# Patient Record
Sex: MALE | Race: WHITE | Employment: PART TIME | URBAN - METROPOLITAN AREA
[De-identification: names, ages, dates, MRNs, and addresses within clinical notes are randomized per-mention and may not be internally consistent; named-entity substitution may affect disease eponyms.]

---

## 2017-03-16 ENCOUNTER — APPOINTMENT (OUTPATIENT)
Dept: CT IMAGING | Age: 22
End: 2017-03-16
Attending: EMERGENCY MEDICINE
Payer: OTHER GOVERNMENT

## 2017-03-16 ENCOUNTER — HOSPITAL ENCOUNTER (EMERGENCY)
Age: 22
Discharge: HOME | End: 2017-03-16
Attending: EMERGENCY MEDICINE
Payer: OTHER GOVERNMENT

## 2017-03-16 VITALS
DIASTOLIC BLOOD PRESSURE: 70 MMHG | HEIGHT: 73 IN | WEIGHT: 182 LBS | HEART RATE: 71 BPM | SYSTOLIC BLOOD PRESSURE: 127 MMHG | RESPIRATION RATE: 18 BRPM | BODY MASS INDEX: 24.12 KG/M2 | OXYGEN SATURATION: 98 % | TEMPERATURE: 98.1 F
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
## 2017-03-16 DIAGNOSIS — R51.9 FRONTAL HEADACHE: ICD-10-CM

## 2017-03-16 PROCEDURE — 99284 EMERGENCY DEPT VISIT MOD MDM: CPT

## 2017-03-16 PROCEDURE — 74011250637 HC RX REV CODE- 250/637: Performed by: PHYSICIAN ASSISTANT

## 2017-03-16 PROCEDURE — 70450 CT HEAD/BRAIN W/O DYE: CPT

## 2017-03-16 RX ORDER — IBUPROFEN 600 MG/1
600 TABLET ORAL
Status: COMPLETED | OUTPATIENT
Start: 2017-03-16 | End: 2017-03-16

## 2017-03-16 RX ORDER — CYCLOBENZAPRINE HCL 10 MG
10 TABLET ORAL
Qty: 15 TAB | Refills: 0 | Status: SHIPPED | OUTPATIENT
Start: 2017-03-16

## 2017-03-16 RX ORDER — CYCLOBENZAPRINE HCL 10 MG
10 TABLET ORAL
Status: COMPLETED | OUTPATIENT
Start: 2017-03-16 | End: 2017-03-16

## 2017-03-16 RX ORDER — IBUPROFEN 600 MG/1
600 TABLET ORAL
Qty: 21 TAB | Refills: 0 | Status: SHIPPED | OUTPATIENT
Start: 2017-03-16 | End: 2017-03-23

## 2017-03-16 RX ADMIN — CYCLOBENZAPRINE HYDROCHLORIDE 10 MG: 10 TABLET, FILM COATED ORAL at 22:50

## 2017-03-16 RX ADMIN — IBUPROFEN 600 MG: 600 TABLET, FILM COATED ORAL at 22:50

## 2017-03-16 NOTE — LETTER
NOTIFICATION RETURN TO WORK / SCHOOL 
 
3/16/2017 11:01 PM 
 
Mr. Efrain Buchanan [facility] [id number] To Whom It May Concern: 
 
Mike Elizabeth is currently under the care of [facility]. He will return to work/school on: 03/18/17 If there are questions or concerns please have the patient contact our office.  
 
 
 
Sincerely, 
 
 
 
 
 
 
LG Carlin
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
## 2020-02-19 ENCOUNTER — HOSPITAL ENCOUNTER (EMERGENCY)
Age: 25
Discharge: HOME | End: 2020-02-19
Attending: EMERGENCY MEDICINE
Payer: SELF-PAY

## 2020-02-19 ENCOUNTER — HOSPITAL ENCOUNTER (OUTPATIENT)
Dept: CT IMAGING | Age: 25
Discharge: HOME | End: 2020-02-19
Attending: NURSE PRACTITIONER
Payer: SELF-PAY

## 2020-02-19 VITALS
SYSTOLIC BLOOD PRESSURE: 120 MMHG | TEMPERATURE: 97.8 F | HEART RATE: 85 BPM | DIASTOLIC BLOOD PRESSURE: 75 MMHG | RESPIRATION RATE: 16 BRPM | OXYGEN SATURATION: 98 %

## 2020-02-19 DIAGNOSIS — R11.2 NON-INTRACTABLE VOMITING WITH NAUSEA, UNSPECIFIED VOMITING TYPE: ICD-10-CM

## 2020-02-19 DIAGNOSIS — R10.84 ABDOMINAL PAIN, GENERALIZED: Primary | ICD-10-CM

## 2020-02-19 DIAGNOSIS — R19.7 DIARRHEA, UNSPECIFIED TYPE: ICD-10-CM

## 2020-02-19 LAB
ALBUMIN SERPL-MCNC: 4.5 G/DL (ref 3.4–5)
ALBUMIN/GLOB SERPL: 1.3 {RATIO} (ref 0.8–1.7)
ALP SERPL-CCNC: 72 U/L (ref 45–117)
ALT SERPL-CCNC: 20 U/L (ref 16–61)
ANION GAP SERPL CALC-SCNC: 7 MMOL/L (ref 3–18)
APPEARANCE UR: CLEAR
AST SERPL-CCNC: 13 U/L (ref 10–38)
BACTERIA URNS QL MICRO: ABNORMAL /HPF
BASOPHILS # BLD: 0 K/UL (ref 0–0.1)
BASOPHILS NFR BLD: 0 % (ref 0–2)
BILIRUB SERPL-MCNC: 1.7 MG/DL (ref 0.2–1)
BILIRUB UR QL: NEGATIVE
BUN SERPL-MCNC: 21 MG/DL (ref 7–18)
BUN/CREAT SERPL: 22 (ref 12–20)
CALCIUM SERPL-MCNC: 9.5 MG/DL (ref 8.5–10.1)
CHLORIDE SERPL-SCNC: 107 MMOL/L (ref 100–111)
CO2 SERPL-SCNC: 26 MMOL/L (ref 21–32)
COLOR UR: ABNORMAL
CREAT SERPL-MCNC: 0.94 MG/DL (ref 0.6–1.3)
DIFFERENTIAL METHOD BLD: ABNORMAL
EOSINOPHIL # BLD: 0 K/UL (ref 0–0.4)
EOSINOPHIL NFR BLD: 0 % (ref 0–5)
EPITH CASTS URNS QL MICRO: ABNORMAL /LPF (ref 0–5)
ERYTHROCYTE [DISTWIDTH] IN BLOOD BY AUTOMATED COUNT: 12.6 % (ref 11.6–14.5)
GLOBULIN SER CALC-MCNC: 3.6 G/DL (ref 2–4)
GLUCOSE SERPL-MCNC: 113 MG/DL (ref 74–99)
GLUCOSE UR STRIP.AUTO-MCNC: NEGATIVE MG/DL
HCT VFR BLD AUTO: 44.5 % (ref 36–48)
HGB BLD-MCNC: 16.1 G/DL (ref 13–16)
HGB UR QL STRIP: NEGATIVE
KETONES UR QL STRIP.AUTO: >160 MG/DL
LEUKOCYTE ESTERASE UR QL STRIP.AUTO: ABNORMAL
LIPASE SERPL-CCNC: 28 U/L (ref 73–393)
LYMPHOCYTES # BLD: 0.4 K/UL (ref 0.9–3.6)
LYMPHOCYTES NFR BLD: 4 % (ref 21–52)
MCH RBC QN AUTO: 31.9 PG (ref 24–34)
MCHC RBC AUTO-ENTMCNC: 36.2 G/DL (ref 31–37)
MCV RBC AUTO: 88.3 FL (ref 74–97)
MONOCYTES # BLD: 0.3 K/UL (ref 0.05–1.2)
MONOCYTES NFR BLD: 3 % (ref 3–10)
MUCOUS THREADS URNS QL MICRO: ABNORMAL /LPF
NEUTS SEG # BLD: 10 K/UL (ref 1.8–8)
NEUTS SEG NFR BLD: 93 % (ref 40–73)
NITRITE UR QL STRIP.AUTO: NEGATIVE
PH UR STRIP: 6 [PH] (ref 5–8)
PLATELET # BLD AUTO: 147 K/UL (ref 135–420)
PMV BLD AUTO: 10.9 FL (ref 9.2–11.8)
POTASSIUM SERPL-SCNC: 3.9 MMOL/L (ref 3.5–5.5)
PROT SERPL-MCNC: 8.1 G/DL (ref 6.4–8.2)
PROT UR STRIP-MCNC: ABNORMAL MG/DL
RBC # BLD AUTO: 5.04 M/UL (ref 4.7–5.5)
RBC #/AREA URNS HPF: ABNORMAL /HPF (ref 0–5)
SODIUM SERPL-SCNC: 140 MMOL/L (ref 136–145)
SP GR UR REFRACTOMETRY: >1.03 (ref 1–1.03)
UROBILINOGEN UR QL STRIP.AUTO: 1 EU/DL (ref 0.2–1)
WBC # BLD AUTO: 10.8 K/UL (ref 4.6–13.2)
WBC URNS QL MICRO: ABNORMAL /HPF (ref 0–4)

## 2020-02-19 PROCEDURE — 85025 COMPLETE CBC W/AUTO DIFF WBC: CPT

## 2020-02-19 PROCEDURE — 83690 ASSAY OF LIPASE: CPT

## 2020-02-19 PROCEDURE — 99284 EMERGENCY DEPT VISIT MOD MDM: CPT

## 2020-02-19 PROCEDURE — 74011636320 HC RX REV CODE- 636/320: Performed by: NURSE PRACTITIONER

## 2020-02-19 PROCEDURE — 80053 COMPREHEN METABOLIC PANEL: CPT

## 2020-02-19 PROCEDURE — 74177 CT ABD & PELVIS W/CONTRAST: CPT

## 2020-02-19 PROCEDURE — 74011250636 HC RX REV CODE- 250/636: Performed by: NURSE PRACTITIONER

## 2020-02-19 PROCEDURE — 81001 URINALYSIS AUTO W/SCOPE: CPT

## 2020-02-19 RX ORDER — ONDANSETRON 4 MG/1
4 TABLET, ORALLY DISINTEGRATING ORAL
Qty: 12 TAB | Refills: 0 | Status: SHIPPED | OUTPATIENT
Start: 2020-02-19

## 2020-02-19 RX ADMIN — SODIUM CHLORIDE 1000 ML: 900 INJECTION, SOLUTION INTRAVENOUS at 10:57

## 2020-02-19 RX ADMIN — IOPAMIDOL 100 ML: 612 INJECTION, SOLUTION INTRAVENOUS at 12:17

## 2020-02-19 NOTE — ED PROVIDER NOTES
EMERGENCY DEPARTMENT HISTORY AND PHYSICAL EXAM    Date: 2/19/2020  Patient Name: Yina Oneil    History of Presenting Illness     Chief Complaint   Patient presents with    Abdominal Pain    Diarrhea         History Provided By: Patient      Additional History (Context): Yina Oneil is a 30-year-old male with no significant past medical history the presents to the ER with complaints of abdominal pain, nausea, vomiting, and diarrhea since 8 PM yesterday. [facility] and sent to the ER for a rule out of appendicitis due to a white count of 15,000. He reports subjective fever and chills, anorexia, nonbloody emesis/stool, and diffuse abdominal pains. Pains initially started in the left lower quadrant and have since migrated to the right lower quadrant and periumbilical region. He denies any urinary complaints. PCP: None    Current Outpatient Medications   Medication Sig Dispense Refill    ondansetron (ZOFRAN ODT) 4 mg disintegrating tablet Take 1 Tab by mouth every eight (8) hours as needed for Nausea or Vomiting for up to 12 doses. 12 Tab 0    cyclobenzaprine (FLEXERIL) 10 mg tablet Take 1 Tab by mouth three (3) times daily as needed for Muscle Spasm(s). 15 Tab 0       Past History     Past Medical History:  History reviewed. No pertinent past medical history. Past Surgical History:  History reviewed. No pertinent surgical history. Family History:  History reviewed. No pertinent family history. Social History:  Social History     Tobacco Use    Smoking status: Not on file   Substance Use Topics    Alcohol use: Not on file    Drug use: Not on file       Allergies:  No Known Allergies      Review of Systems     Review of Systems   Constitutional: Negative for chills and fever. HENT: Negative for nasal congestion, sore throat, rhinorrhea  Eyes: Negative. Respiratory: negative  cough and negative for shortness of breath.     Cardiovascular: Negative for chest pain and palpitations. Gastrointestinal: Positive for abdominal pain, diarrhea, nausea, and vomiting. Negative for constipation. Genitourinary: Negative for difficulty urinating, hematuria, and flank pain. Musculoskeletal: Negative for back pain. Negative for gait problem and neck pain. Skin: Negative for rash. Allergic/Immunologic: Negative. Neurological: Negative for dizziness, weakness, numbness and headaches. Psychiatric/Behavioral: Negative. All other systems reviewed and are negative. All Other Systems Negative  Physical Exam     Vitals:    02/19/20 1004   BP: 120/75   Pulse: (!) 103   Resp: 16   Temp: 97.8 °F (36.6 °C)   SpO2: 98%     Physical Exam  Vitals signs and nursing note reviewed. Constitutional:       General: He is not in acute distress. Appearance: Normal appearance. He is normal weight. He is not ill-appearing, toxic-appearing or diaphoretic. Comments: Thin build   HENT:      Head: Normocephalic and atraumatic. Mouth/Throat:      Mouth: Mucous membranes are dry. Pharynx: Oropharynx is clear. Eyes:      General: Vision grossly intact. Gaze aligned appropriately. No scleral icterus. Right eye: No discharge. Left eye: No discharge. Conjunctiva/sclera: Conjunctivae normal.      Pupils: Pupils are equal, round, and reactive to light. Neck:      Musculoskeletal: Full passive range of motion without pain, normal range of motion and neck supple. Cardiovascular:      Rate and Rhythm: Normal rate and regular rhythm. Heart sounds: Normal heart sounds. No murmur. No friction rub. No gallop. Pulmonary:      Effort: Pulmonary effort is normal. No respiratory distress. Breath sounds: Normal breath sounds. No wheezing or rhonchi. Abdominal:      General: Abdomen is flat. Bowel sounds are increased. There is no distension or abdominal bruit. There are no signs of injury. Palpations: Abdomen is soft.  There is no shifting dullness, fluid wave, hepatomegaly, splenomegaly, mass or pulsatile mass. Tenderness: There is abdominal tenderness in the right lower quadrant, periumbilical area and left lower quadrant. There is no right CVA tenderness, left CVA tenderness, guarding or rebound. Positive signs include Rovsing's sign and McBurney's sign. Negative signs include Villaseñor's sign, psoas sign and obturator sign. Hernia: No hernia is present. Musculoskeletal: Normal range of motion. Skin:     General: Skin is warm and dry. Capillary Refill: Capillary refill takes less than 2 seconds. Findings: No rash. Neurological:      General: No focal deficit present. Mental Status: He is alert and oriented to person, place, and time. Psychiatric:         Mood and Affect: Mood normal.         Behavior: Behavior normal.           Diagnostic Study Results     Labs -     Recent Results (from the past 12 hour(s))   METABOLIC PANEL, COMPREHENSIVE    Collection Time: 02/19/20 10:40 AM   Result Value Ref Range    Sodium 140 136 - 145 mmol/L    Potassium 3.9 3.5 - 5.5 mmol/L    Chloride 107 100 - 111 mmol/L    CO2 26 21 - 32 mmol/L    Anion gap 7 3.0 - 18 mmol/L    Glucose 113 (H) 74 - 99 mg/dL    BUN 21 (H) 7.0 - 18 MG/DL    Creatinine 0.94 0.6 - 1.3 MG/DL    BUN/Creatinine ratio 22 (H) 12 - 20      GFR est AA >60 >60 ml/min/1.73m2    GFR est non-AA >60 >60 ml/min/1.73m2    Calcium 9.5 8.5 - 10.1 MG/DL    Bilirubin, total 1.7 (H) 0.2 - 1.0 MG/DL    ALT (SGPT) 20 16 - 61 U/L    AST (SGOT) 13 10 - 38 U/L    Alk.  phosphatase 72 45 - 117 U/L    Protein, total 8.1 6.4 - 8.2 g/dL    Albumin 4.5 3.4 - 5.0 g/dL    Globulin 3.6 2.0 - 4.0 g/dL    A-G Ratio 1.3 0.8 - 1.7     LIPASE    Collection Time: 02/19/20 10:40 AM   Result Value Ref Range    Lipase 28 (L) 73 - 393 U/L   CBC WITH AUTOMATED DIFF    Collection Time: 02/19/20 10:40 AM   Result Value Ref Range    WBC 10.8 4.6 - 13.2 K/uL    RBC 5.04 4.70 - 5.50 M/uL    HGB 16.1 (H) 13.0 - 16.0 g/dL    HCT 44.5 36.0 - 48.0 %    MCV 88.3 74.0 - 97.0 FL    MCH 31.9 24.0 - 34.0 PG    MCHC 36.2 31.0 - 37.0 g/dL    RDW 12.6 11.6 - 14.5 %    PLATELET 992 834 - 239 K/uL    MPV 10.9 9.2 - 11.8 FL    NEUTROPHILS 93 (H) 40 - 73 %    LYMPHOCYTES 4 (L) 21 - 52 %    MONOCYTES 3 3 - 10 %    EOSINOPHILS 0 0 - 5 %    BASOPHILS 0 0 - 2 %    ABS. NEUTROPHILS 10.0 (H) 1.8 - 8.0 K/UL    ABS. LYMPHOCYTES 0.4 (L) 0.9 - 3.6 K/UL    ABS. MONOCYTES 0.3 0.05 - 1.2 K/UL    ABS. EOSINOPHILS 0.0 0.0 - 0.4 K/UL    ABS. BASOPHILS 0.0 0.0 - 0.1 K/UL    DF AUTOMATED     URINALYSIS W/ RFLX MICROSCOPIC    Collection Time: 02/19/20 10:40 AM   Result Value Ref Range    Color DARK YELLOW      Appearance CLEAR      Specific gravity >1.030 (H) 1.005 - 1.030    pH (UA) 6.0 5.0 - 8.0      Protein TRACE (A) NEG mg/dL    Glucose NEGATIVE  NEG mg/dL    Ketone >160 (A) NEG mg/dL    Bilirubin NEGATIVE  NEG      Blood NEGATIVE  NEG      Urobilinogen 1.0 0.2 - 1.0 EU/dL    Nitrites NEGATIVE  NEG      Leukocyte Esterase TRACE (A) NEG     URINE MICROSCOPIC ONLY    Collection Time: 02/19/20 10:40 AM   Result Value Ref Range    WBC 1 to 3 0 - 4 /hpf    RBC 0 to 2 0 - 5 /hpf    Epithelial cells FEW 0 - 5 /lpf    Bacteria FEW (A) NEG /hpf    Mucus 2+ (A) NEG /lpf       Radiologic Studies -   CT ABD PELV W CONT   Final Result   IMPRESSION:       1. Small tubular structure connecting the cecum without pericecal inflammation,   suggestive of a normal appendix. 2. No bowel dilatation, obstruction or bowel wall thickening. .      Thank you for this referral.          CT Results  (Last 48 hours)               02/19/20 1217  CT ABD PELV W CONT Final result    Impression:  IMPRESSION:        1. Small tubular structure connecting the cecum without pericecal inflammation,   suggestive of a normal appendix. 2. No bowel dilatation, obstruction or bowel wall thickening. .       Thank you for this referral.         Narrative:  CT of abdomen and pelvis with contrast        INDICATION: Right lower quadrant pain, nausea and diarrhea       COMPARISON: None. TECHNIQUE: 5 mm helical scan to the abdomen and pelvis is obtained  from the   diaphragm to the symphysis pubis after uneventful nonionic IV contrast   administration. All CT scans at this facility performed using dose optimization techniques as   appreciated to a performed exam, to include automated exposure control,   adjustment of the mA and or KU according to patient size (including appropriate   matching for site specific examination), or use of iterative reconstruction   technique. CONTRAST: 100 cc Isovue 370. FINDINGS:        Lung Bases: Clear. Liver: Normal.       Gallbladder: Normal.        Biliary System: No ductal dilatation. Spleen: Normal.       Pancreas: Normal.       Kidneys: Unremarkable. Adrenal Glands: Normal.       Bowel: The stomach is poorly distended. The small and large bowel are   nondilated. There is a small tubular structure identified connecting the   inferior cecum as most likely normal appendix. No pericecal inflammation or   bowel wall thickening. Genital structures: The bladder is poorly distended. The prostate is not   enlarged. Peritoneum/Retroperitoneum: No adenopathy. No free air or fluid. Vasculature: Unremarkable for age. Other:  None. CT OSSEOUS STRUCTURES:          Unremarkable for age. CXR Results  (Last 48 hours)    None            Medical Decision Making   I am the first provider for this patient. I reviewed the vital signs, available nursing notes, past medical history, past surgical history, family history and social history. Vital Signs-Reviewed the patient's vital signs.         Records Reviewed: Nursing notes, old medical records and any previous labs, imaging, visits, consultations pertinent to patient care    Procedures:  Procedures      ED Course: Progress Notes, Reevaluation, and Consults:      Provider Notes (Medical Decision Making):     Differential diagnosis: appendicitis, testicular torsion, constipation, gastroenteritis, IBS, inflammatory bowel disease, celiac disease, colitis/diverticulitis, nephrolithiasis, pyelonephritis/cystitis, mesenteric ischemia,  ruptured AAA, SBO, LBO    Patient presents ambulatory in no acute distress, non-toxic in appearance, with mild tachycardia and otherwise normal vital signs. His lips are dry showing some dehydration. He is afebrile. Exam of the abdomen reveals tenderness to the left lower quadrant with a positive Rovsing sign as well as in the right lower quadrant. He has no rebound or guarding. Patient was placed n.p.o., IV fluids and antiemetic was ordered, labs, and CT to rule out appendicitis. CBC with no leukocytosis or anemia. Urinalysis is concentrated with ketones consistent with dehydration due to the vomiting and diarrhea. CMP with normal electrolytes, kidney function, and liver function. Lipase is negative. CT abdomen and pelvis negative for acute appendicitis, although the appendix was not completely visualized there was no inflammation or stranding noted. Patient appears well and comfortable and reports subjective improvement in nausea with fluids and antiemetic. Doubt acute surgical process. Repeat abdominal exam reveals soft and very diffusely tender abdomen in the bilateral lower quadrants. No new symptoms on re-evaluation. I do not feel that any additional emergent imaging is warranted at this time. Will discharge home with with close follow-up in the ER in 12 to 24 hours for a repeat abdominal examination. He is to return sooner for any fever, persistent vomiting, or increased pain. Discharged with Zofran and referral to GI. MED RECONCILIATION:  No current facility-administered medications for this encounter.       Current Outpatient Medications   Medication Sig    ondansetron (ZOFRAN ODT) 4 mg disintegrating tablet Take 1 Tab by mouth every eight (8) hours as needed for Nausea or Vomiting for up to 12 doses.  cyclobenzaprine (FLEXERIL) 10 mg tablet Take 1 Tab by mouth three (3) times daily as needed for Muscle Spasm(s). Disposition:  Home in stable condition. DISCHARGE NOTE:     Patient has been reexamined. Patient has no new complaints, changes, or physical findings. Patient demonstrates understanding of current diagnoses and is in agreement with the treatment plan. They are advised that while the likelihood of serious underlying condition is low at this point given the evaluation performed today, we cannot fully rule it out. They are advised to immediately return with any new symptoms or worsening of current condition. Care plan outlined and precautions discussed. Discussed proper way to take medications. Medication use, risk/benefit, side effects and precautions discussed in detail. Discussed treatment plan, return precautions, symptomatic relief, and expected time to improvement. All questions answered. Patient is stable for discharge and outpatient management. Patient is ready to go home. Follow-up Information     Follow up With Specialties Details Why [address]    [facility] EM[facility]GENCY DEPT Emergency Medicine In 1 day For recheck. Sooner for worsening, fever, or return of vomiting. [address] [facility]  [phone]    [facility] EMERGENCY DEPT Emergency Medicine  As needed, If symptoms worsen [address] [place] [address] [address] [facility]    Natalie Dejesus MD Gastroenterology Schedule an appointment as soon as possible for a visit Follow-up from the Emergency Department [address] [facility] [address] [address]            Discharge Medication List as of 2/19/2020  1:11 PM      START taking these medications    Details   ondansetron (ZOFRAN ODT) 4 mg disintegrating tablet Take 1 Tab by mouth every eight (8) hours as needed for Nausea or Vomiting for up to 12 doses. , Print, Disp-12 Tab, R-0         CONTINUE these medications which have NOT CHANGED    Details   cyclobenzaprine (FLEXERIL) 10 mg tablet Take 1 Tab by mouth three (3) times daily as needed for Muscle Spasm(s). , Print, Disp-15 Tab, R-0                   Diagnosis     Clinical Impression:   1. Abdominal pain, generalized    2. Diarrhea, unspecified type    3. Non-intractable vomiting with nausea, unspecified vomiting type        Dictation disclaimer:  Please note that this dictation was completed with [employer], the [employer] voice recognition software. Quite often unanticipated grammatical, syntax, homophones, and other interpretive errors are inadvertently transcribed by the computer software. Please disregard these errors. Please excuse any errors that have escaped final proofreading.

## 2020-02-19 NOTE — DISCHARGE INSTRUCTIONS
Patient Education      Take medication as prescribed on an as-needed basis. Start with clear liquids and advance as tolerated over the next 24 hours. Return to the ER in 12 to 24 hours for recheck of your abdomen, sooner for worsening as discussed. Abdominal Pain: Care Instructions  Your Care Instructions    Abdominal pain has many possible causes. Some aren't serious and get better on their own in a few days. Others need more testing and treatment. If your pain continues or gets worse, you need to be rechecked and may need more tests to find out what is wrong. You may need surgery to correct the problem. Don't ignore new symptoms, such as fever, nausea and vomiting, urination problems, pain that gets worse, and dizziness. These may be signs of a more serious problem. Your doctor may have recommended a follow-up visit in the next 8 to 12 hours. If you are not getting better, you may need more tests or treatment. The doctor has checked you carefully, but problems can develop later. If you notice any problems or new symptoms, get medical treatment right away. Follow-up care is a key part of your treatment and safety. Be sure to make and go to all appointments, and call your doctor if you are having problems. It's also a good idea to know your test results and keep a list of the medicines you take. How can you care for yourself at home? · Rest until you feel better. · To prevent dehydration, drink plenty of fluids, enough so that your urine is light yellow or clear like water. Choose water and other caffeine-free clear liquids until you feel better. If you have kidney, heart, or liver disease and have to limit fluids, talk with your doctor before you increase the amount of fluids you drink. · If your stomach is upset, eat mild foods, such as rice, dry toast or crackers, bananas, and applesauce. Try eating several small meals instead of two or three large ones.   · Wait until 48 hours after all symptoms have gone away before you have spicy foods, alcohol, and drinks that contain caffeine. · Do not eat foods that are high in fat. · Avoid anti-inflammatory medicines such as aspirin, ibuprofen (Advil, Motrin), and naproxen (Aleve). These can cause stomach upset. Talk to your doctor if you take daily aspirin for another health problem. When should you call for help? Call 911 anytime you think you may need emergency care. For example, call if:    · You passed out (lost consciousness).     · You pass maroon or very bloody stools.     · You vomit blood or what looks like coffee grounds.     · You have new, severe belly pain.    Call your doctor now or seek immediate medical care if:    · Your pain gets worse, especially if it becomes focused in one area of your belly.     · You have a new or higher fever.     · Your stools are black and look like tar, or they have streaks of blood.     · You have unexpected vaginal bleeding.     · You have symptoms of a urinary tract infection. These may include:  ? Pain when you urinate. ? Urinating more often than usual.  ? Blood in your urine.     · You are dizzy or lightheaded, or you feel like you may faint.    Watch closely for changes in your health, and be sure to contact your doctor if:    · You are not getting better after 1 day (24 hours). Where can you learn more? Go to http://franck-jose rafael.info/. Enter L460 in the search box to learn more about \"Abdominal Pain: Care Instructions. \"  Current as of: June 26, 2019  Content Version: 12.2  © 2393-2261 [employer]. Care instructions adapted under license by [employer] (which disclaims liability or warranty for this information). If you have questions about a medical condition or this instruction, always ask your healthcare professional. [address] any warranty or liability for your use of this information.

## 2022-04-29 ENCOUNTER — HOSPITAL ENCOUNTER (EMERGENCY)
Age: 27
Discharge: HOME | End: 2022-04-29
Attending: EMERGENCY MEDICINE
Payer: MEDICAID

## 2022-04-29 ENCOUNTER — APPOINTMENT (OUTPATIENT)
Dept: GENERAL RADIOLOGY | Age: 27
End: 2022-04-29
Attending: PHYSICIAN ASSISTANT
Payer: MEDICAID

## 2022-04-29 ENCOUNTER — APPOINTMENT (OUTPATIENT)
Dept: CT IMAGING | Age: 27
End: 2022-04-29
Attending: PHYSICIAN ASSISTANT
Payer: MEDICAID

## 2022-04-29 VITALS
HEIGHT: 72 IN | RESPIRATION RATE: 13 BRPM | WEIGHT: 227 LBS | OXYGEN SATURATION: 100 % | BODY MASS INDEX: 30.75 KG/M2 | SYSTOLIC BLOOD PRESSURE: 123 MMHG | HEART RATE: 105 BPM | TEMPERATURE: 99.2 F | DIASTOLIC BLOOD PRESSURE: 74 MMHG

## 2022-04-29 DIAGNOSIS — M54.50 ACUTE LOW BACK PAIN WITHOUT SCIATICA, UNSPECIFIED BACK PAIN LATERALITY: Primary | ICD-10-CM

## 2022-04-29 LAB
ALBUMIN SERPL-MCNC: 4.5 G/DL (ref 3.4–5)
ALBUMIN/GLOB SERPL: 1.2 {RATIO} (ref 0.8–1.7)
ALP SERPL-CCNC: 78 U/L (ref 45–117)
ALT SERPL-CCNC: 26 U/L (ref 16–61)
ANION GAP SERPL CALC-SCNC: 3 MMOL/L (ref 3–18)
APPEARANCE UR: CLEAR
AST SERPL-CCNC: 25 U/L (ref 10–38)
BASOPHILS # BLD: 0.1 K/UL (ref 0–0.1)
BASOPHILS NFR BLD: 1 % (ref 0–2)
BILIRUB SERPL-MCNC: 0.4 MG/DL (ref 0.2–1)
BILIRUB UR QL: NEGATIVE
BUN SERPL-MCNC: 11 MG/DL (ref 7–18)
BUN/CREAT SERPL: 12 (ref 12–20)
CALCIUM SERPL-MCNC: 9.9 MG/DL (ref 8.5–10.1)
CHLORIDE SERPL-SCNC: 104 MMOL/L (ref 100–111)
CO2 SERPL-SCNC: 28 MMOL/L (ref 21–32)
COLOR UR: YELLOW
CREAT SERPL-MCNC: 0.91 MG/DL (ref 0.6–1.3)
DIFFERENTIAL METHOD BLD: ABNORMAL
EOSINOPHIL # BLD: 0.1 K/UL (ref 0–0.4)
EOSINOPHIL NFR BLD: 1 % (ref 0–5)
ERYTHROCYTE [DISTWIDTH] IN BLOOD BY AUTOMATED COUNT: 11.9 % (ref 11.6–14.5)
GLOBULIN SER CALC-MCNC: 3.8 G/DL (ref 2–4)
GLUCOSE SERPL-MCNC: 94 MG/DL (ref 74–99)
GLUCOSE UR STRIP.AUTO-MCNC: NEGATIVE MG/DL
HCT VFR BLD AUTO: 43.6 % (ref 36–48)
HGB BLD-MCNC: 15.7 G/DL (ref 13–16)
HGB UR QL STRIP: NEGATIVE
IMM GRANULOCYTES # BLD AUTO: 0 K/UL (ref 0–0.04)
IMM GRANULOCYTES NFR BLD AUTO: 0 % (ref 0–0.5)
KETONES UR QL STRIP.AUTO: NEGATIVE MG/DL
LACTATE BLD-SCNC: 1.44 MMOL/L (ref 0.4–2)
LACTATE BLD-SCNC: 2.66 MMOL/L (ref 0.4–2)
LEUKOCYTE ESTERASE UR QL STRIP.AUTO: NEGATIVE
LYMPHOCYTES # BLD: 0.3 K/UL (ref 0.9–3.6)
LYMPHOCYTES NFR BLD: 4 % (ref 21–52)
MCH RBC QN AUTO: 31.7 PG (ref 24–34)
MCHC RBC AUTO-ENTMCNC: 36 G/DL (ref 31–37)
MCV RBC AUTO: 87.9 FL (ref 78–100)
MONOCYTES # BLD: 0.9 K/UL (ref 0.05–1.2)
MONOCYTES NFR BLD: 14 % (ref 3–10)
NEUTS SEG # BLD: 5.4 K/UL (ref 1.8–8)
NEUTS SEG NFR BLD: 80 % (ref 40–73)
NITRITE UR QL STRIP.AUTO: NEGATIVE
NRBC # BLD: 0 K/UL (ref 0–0.01)
NRBC BLD-RTO: 0 PER 100 WBC
PH UR STRIP: >8.5 [PH] (ref 5–8)
PLATELET # BLD AUTO: 186 K/UL (ref 135–420)
PMV BLD AUTO: 10.3 FL (ref 9.2–11.8)
POTASSIUM SERPL-SCNC: 4 MMOL/L (ref 3.5–5.5)
PROT SERPL-MCNC: 8.3 G/DL (ref 6.4–8.2)
PROT UR STRIP-MCNC: NEGATIVE MG/DL
RBC # BLD AUTO: 4.96 M/UL (ref 4.35–5.65)
SODIUM SERPL-SCNC: 135 MMOL/L (ref 136–145)
SP GR UR REFRACTOMETRY: 1.01 (ref 1–1.03)
UROBILINOGEN UR QL STRIP.AUTO: 0.2 EU/DL (ref 0.2–1)
WBC # BLD AUTO: 6.8 K/UL (ref 4.6–13.2)

## 2022-04-29 PROCEDURE — 99285 EMERGENCY DEPT VISIT HI MDM: CPT

## 2022-04-29 PROCEDURE — 83605 ASSAY OF LACTIC ACID: CPT

## 2022-04-29 PROCEDURE — 80053 COMPREHEN METABOLIC PANEL: CPT

## 2022-04-29 PROCEDURE — 96375 TX/PRO/DX INJ NEW DRUG ADDON: CPT

## 2022-04-29 PROCEDURE — 74011250636 HC RX REV CODE- 250/636: Performed by: PHYSICIAN ASSISTANT

## 2022-04-29 PROCEDURE — 74177 CT ABD & PELVIS W/CONTRAST: CPT

## 2022-04-29 PROCEDURE — 93005 ELECTROCARDIOGRAM TRACING: CPT

## 2022-04-29 PROCEDURE — 74011000636 HC RX REV CODE- 636: Performed by: EMERGENCY MEDICINE

## 2022-04-29 PROCEDURE — 81003 URINALYSIS AUTO W/O SCOPE: CPT

## 2022-04-29 PROCEDURE — 87040 BLOOD CULTURE FOR BACTERIA: CPT

## 2022-04-29 PROCEDURE — 85025 COMPLETE CBC W/AUTO DIFF WBC: CPT

## 2022-04-29 PROCEDURE — 71045 X-RAY EXAM CHEST 1 VIEW: CPT

## 2022-04-29 PROCEDURE — 96374 THER/PROPH/DIAG INJ IV PUSH: CPT

## 2022-04-29 RX ORDER — ONDANSETRON 2 MG/ML
4 INJECTION INTRAMUSCULAR; INTRAVENOUS
Status: COMPLETED | OUTPATIENT
Start: 2022-04-29 | End: 2022-04-29

## 2022-04-29 RX ORDER — FENTANYL CITRATE 50 UG/ML
50 INJECTION, SOLUTION INTRAMUSCULAR; INTRAVENOUS
Status: COMPLETED | OUTPATIENT
Start: 2022-04-29 | End: 2022-04-29

## 2022-04-29 RX ORDER — HYDROMORPHONE HYDROCHLORIDE 1 MG/ML
1 INJECTION, SOLUTION INTRAMUSCULAR; INTRAVENOUS; SUBCUTANEOUS ONCE
Status: COMPLETED | OUTPATIENT
Start: 2022-04-29 | End: 2022-04-29

## 2022-04-29 RX ORDER — KETOROLAC TROMETHAMINE 15 MG/ML
15 INJECTION, SOLUTION INTRAMUSCULAR; INTRAVENOUS ONCE
Status: COMPLETED | OUTPATIENT
Start: 2022-04-29 | End: 2022-04-29

## 2022-04-29 RX ORDER — CYCLOBENZAPRINE HCL 5 MG
10 TABLET ORAL 3 TIMES DAILY
Qty: 9 TABLET | Refills: 0 | Status: SHIPPED | OUTPATIENT
Start: 2022-04-29

## 2022-04-29 RX ORDER — SODIUM CHLORIDE 0.9 % (FLUSH) 0.9 %
5-10 SYRINGE (ML) INJECTION AS NEEDED
Status: DISCONTINUED | OUTPATIENT
Start: 2022-04-29 | End: 2022-04-29 | Stop reason: HOSPADM

## 2022-04-29 RX ORDER — KETOROLAC TROMETHAMINE 10 MG/1
10 TABLET, FILM COATED ORAL
Qty: 10 TABLET | Refills: 0 | Status: SHIPPED | OUTPATIENT
Start: 2022-04-29

## 2022-04-29 RX ORDER — LIDOCAINE 50 MG/G
PATCH TOPICAL
Qty: 1 EACH | Refills: 0 | Status: SHIPPED | OUTPATIENT
Start: 2022-04-29

## 2022-04-29 RX ADMIN — FENTANYL CITRATE 50 MCG: 50 INJECTION, SOLUTION INTRAMUSCULAR; INTRAVENOUS at 13:21

## 2022-04-29 RX ADMIN — SODIUM CHLORIDE 1000 ML: 9 INJECTION, SOLUTION INTRAVENOUS at 15:05

## 2022-04-29 RX ADMIN — HYDROMORPHONE HYDROCHLORIDE 1 MG: 1 INJECTION, SOLUTION INTRAMUSCULAR; INTRAVENOUS; SUBCUTANEOUS at 15:06

## 2022-04-29 RX ADMIN — SODIUM CHLORIDE 328 ML: 900 INJECTION, SOLUTION INTRAVENOUS at 16:00

## 2022-04-29 RX ADMIN — IOPAMIDOL 100 ML: 612 INJECTION, SOLUTION INTRAVENOUS at 16:19

## 2022-04-29 RX ADMIN — ONDANSETRON 4 MG: 2 INJECTION INTRAMUSCULAR; INTRAVENOUS at 13:21

## 2022-04-29 RX ADMIN — KETOROLAC TROMETHAMINE 15 MG: 15 INJECTION, SOLUTION INTRAMUSCULAR; INTRAVENOUS at 17:40

## 2022-04-29 NOTE — ED PROVIDER NOTES
[address] [facility],[address]  [facility] EMERGENCY DEPT    Date: 4/29/2022  Patient Name: Shane Maldonado    History of Presenting Illness     Chief Complaint   Patient presents with    Back Pain     33 y.o. male otherwise healthy presents the ED complaining of low back pain onset at around 7:00 this morning. Patient states he did sleep on a different mattress, but woke up with severe sharp constant pain in his mid low and bilateral flanks. States he has also had urinary frequency this morning but is able to discern every time he needs to void. No known family hx of DM. Pt does not use any drugs. Patient denies any fever, chills, drug use, bowel or bladder function loss, penile discharge, testicle pain, abd  Pain, hx of kidney stones, other symptoms. Patient denies any other associated signs or symptoms. Patient denies any other complaints. Nursing notes regarding the HPI and triage nursing notes were reviewed. Prior medical records were reviewed. Current Facility-Administered Medications   Medication Dose Route Frequency Provider Last Rate Last Admin    sodium chloride (NS) flush 5-10 mL  5-10 mL IntraVENous PRN LG Moffett         Current Outpatient Medications   Medication Sig Dispense Refill    lidocaine (LIDODERM) 5 % Apply patch to the affected area for 12 hours a day, remove  for 12 hours a day. 1 Each 0    cyclobenzaprine (FLEXERIL) 5 mg tablet Take 2 Tablets by mouth three (3) times daily. 9 Tablet 0    ketorolac (TORADOL) 10 mg tablet Take 1 Tablet by mouth every six (6) hours as needed for Pain for up to 10 doses. 10 Tablet 0    ondansetron (ZOFRAN ODT) 4 mg disintegrating tablet Take 1 Tab by mouth every eight (8) hours as needed for Nausea or Vomiting for up to 12 doses. 12 Tab 0    cyclobenzaprine (FLEXERIL) 10 mg tablet Take 1 Tab by mouth three (3) times daily as needed for Muscle Spasm(s).  15 Tab 0       Past History     Past Medical History:  None     Past Surgical History:  No past surgical history on file. Family History:  No family history on file. Social History:  Social History     Tobacco Use    Smoking status: Not on file    Smokeless tobacco: Not on file   Substance Use Topics    Alcohol use: Not on file    Drug use: Not on file       Allergies:  No Known Allergies    Patient's primary care provider (as noted in EPIC):  None    Review of Systems   Constitutional:  Denies malaise, fever, chills. Head:  Denies injury. Neck:  Denies injury or pain. Chest:  Denies injury. Cardiac:  Denies chest pain or palpitations. Respiratory:  Denies cough, wheezing, difficulty breathing, shortness of breath. GI/ABD:  Denies injury, pain, distention, nausea, vomiting, diarrhea. : + urinary frequency. Back: + back pain. Pelvis:  Denies injury or pain. Extremity/MS:  Denies injury or pain. Neuro:  Denies headache, LOC, dizziness, neurologic symptoms/deficits/paresthesias. Skin: Denies injury, rash, itching or skin changes. All other systems negative as reviewed. Visit Vitals  /74   Pulse (!) 105   Temp 99.2 °F (37.3 °C)   Resp 13   Ht 6' (1.829 m)   Wt 103 kg (227 lb)   SpO2 100%   BMI 30.79 kg/m²       PHYSICAL EXAM:  CONSTITUTIONAL:  Alert,appears uncomfortable;  well developed;  well nourished. HEAD:  Normocephalic, atraumatic. EYES:  EOMI. Non-icteric sclera. Normal conjunctiva. ENTM:  Nose:  no rhinorrhea. Throat:  no erythema or exudate, mucous membranes moist.  NECK:  Supple  RESPIRATORY:  Chest clear, equal breath sounds, good air movement. Without wheezes, rhonchi or rales. CARDIOVASCULAR:  Regular rate and rhythm. No murmurs, rubs, or gallops. GI:  Normal bowel sounds, abdomen soft and non-tender. No rebound or guarding. BACK:  Midline bony TTP and bilateral adjacent CVAT. UPPER EXT:  Normal inspection. LOWER EXT:  Distal pulses intact and 2+ equal bilaterally. Negative SLR.    NEURO:  Moves all four extremities, and grossly normal motor exam.  SKIN:  No rashes;  Normal for age. PSYCH:  Alert and normal affect. IMPRESSION AND MEDICAL DECISION MAKING:  There is no signs of sciatica. No perianal decreased sensation nor bowel/bladder incontinence to suggest a neurological emergency. The patient does not have fever, no other signs of infection to suggest an epidural or other back abscess that would require emergent intervention. The patient denies being an IVDA. Recent Results (from the past 12 hour(s))   CBC WITH AUTOMATED DIFF    Collection Time: 04/29/22  1:20 PM   Result Value Ref Range    WBC 6.8 4.6 - 13.2 K/uL    RBC 4.96 4.35 - 5.65 M/uL    HGB 15.7 13.0 - 16.0 g/dL    HCT 43.6 36.0 - 48.0 %    MCV 87.9 78.0 - 100.0 FL    MCH 31.7 24.0 - 34.0 PG    MCHC 36.0 31.0 - 37.0 g/dL    RDW 11.9 11.6 - 14.5 %    PLATELET 952 718 - 048 K/uL    MPV 10.3 9.2 - 11.8 FL    NRBC 0.0 0  WBC    ABSOLUTE NRBC 0.00 0.00 - 0.01 K/uL    NEUTROPHILS 80 (H) 40 - 73 %    LYMPHOCYTES 4 (L) 21 - 52 %    MONOCYTES 14 (H) 3 - 10 %    EOSINOPHILS 1 0 - 5 %    BASOPHILS 1 0 - 2 %    IMMATURE GRANULOCYTES 0 0.0 - 0.5 %    ABS. NEUTROPHILS 5.4 1.8 - 8.0 K/UL    ABS. LYMPHOCYTES 0.3 (L) 0.9 - 3.6 K/UL    ABS. MONOCYTES 0.9 0.05 - 1.2 K/UL    ABS. EOSINOPHILS 0.1 0.0 - 0.4 K/UL    ABS. BASOPHILS 0.1 0.0 - 0.1 K/UL    ABS. IMM.  GRANS. 0.0 0.00 - 0.04 K/UL    DF AUTOMATED     METABOLIC PANEL, COMPREHENSIVE    Collection Time: 04/29/22  1:20 PM   Result Value Ref Range    Sodium 135 (L) 136 - 145 mmol/L    Potassium 4.0 3.5 - 5.5 mmol/L    Chloride 104 100 - 111 mmol/L    CO2 28 21 - 32 mmol/L    Anion gap 3 3.0 - 18 mmol/L    Glucose 94 74 - 99 mg/dL    BUN 11 7.0 - 18 MG/DL    Creatinine 0.91 0.6 - 1.3 MG/DL    BUN/Creatinine ratio 12 12 - 20      GFR est AA >60 >60 ml/min/1.73m2    GFR est non-AA >60 >60 ml/min/1.73m2    Calcium 9.9 8.5 - 10.1 MG/DL    Bilirubin, total 0.4 0.2 - 1.0 MG/DL    ALT (SGPT) 26 16 - 61 U/L    AST (SGOT) 25 10 - 38 U/L    Alk. phosphatase 78 45 - 117 U/L    Protein, total 8.3 (H) 6.4 - 8.2 g/dL    Albumin 4.5 3.4 - 5.0 g/dL    Globulin 3.8 2.0 - 4.0 g/dL    A-G Ratio 1.2 0.8 - 1.7     URINALYSIS W/ RFLX MICROSCOPIC    Collection Time: 04/29/22  1:25 PM   Result Value Ref Range    Color YELLOW      Appearance CLEAR      Specific gravity 1.015 1.005 - 1.030      pH (UA) >8.5 (H) 5.0 - 8.0    Protein Negative NEG mg/dL    Glucose Negative NEG mg/dL    Ketone Negative NEG mg/dL    Bilirubin Negative NEG      Blood Negative NEG      Urobilinogen 0.2 0.2 - 1.0 EU/dL    Nitrites Negative NEG      Leukocyte Esterase Negative NEG     POC LACTIC ACID    Collection Time: 04/29/22  1:30 PM   Result Value Ref Range    Lactic Acid (POC) 2.66 (HH) 0.40 - 2.00 mmol/L   POC LACTIC ACID    Collection Time: 04/29/22  5:28 PM   Result Value Ref Range    Lactic Acid (POC) 1.44 0.40 - 2.00 mmol/L      CT ABD PELV W CONT    Result Date: 4/29/2022  EXAM:  CT Abdomen-Pelvis with Contrast. CLINICAL INDICATION:  Severe low back pain with dysuria and increased urination. COMPARISON:  02/19/20 TECHNIQUE:  - Helical volumetric CT imaging of the abdomen and pelvis is performed following IV contrast administration. Coronal and sagittal multiplanar reconstruction images are generated for improved anatomic delineation. - IV contrast 100 mL Isovue-300. - All CT scans at this facility are performed using dose optimization technique as appropriate to the performed exam, to include automated exposure control, adjustment of the mA and/or kV according to patient's size (including appropriate matching for site-specific examinations), or use of iterative reconstruction technique. FINDINGS: Lung Bases:  Clear. Liver, Pancreas, Spleen, Gallbladder, Adrenal Glands:  Unremarkable. Kidneys-Ureters:  Unremarkable. Bladder, Prostate:  Unremarkable. Gastrointestinal Tract:  - Unremarkable stomach. - No acute findings along the small bowel.   No small bowel obstruction.  - Normal appendix.  - No acute diverticulitis or colitis. Nodes:  No mesenteric or retroperitoneal adenopathy. Vascular:  Non-aneurysmal aorta. Bones:  No acute osseous findings. No acute abnormalities. XR CHEST PORT    Result Date: 4/29/2022  EXAM:  XR CHEST PORT INDICATION:   meets SIRS criteria, lower back pain and dysuria COMPARISON: None. FINDINGS: The cardiac and mediastinal silhouette are within normal limits. Pulmonary vasculature is within normal limits. No pneumothorax or pleural effusions. No air space opacity. No acute osseous abnormality. No radiographic evidence of acute cardiopulmonary process. Patient presents complaining of severe low back pain, he is tender to the spine and adjacent region bilaterally. Patient initially tachycardic and tachypneic, likely due to the discomfort. He was also having some chills and nausea, vomited once this morning. Patient initially given fentanyl, which he states improved his pain greatly. He was then given Dilaudid when his pain returned. Urinalysis and labs look well, CT unremarkable for any acute process. Patient does not have any neurologic deficit. He is afebrile. Doubt any cauda equina as he does not have any bowel or bladder function loss, saddle paresthesias, numbness or weakness in his legs. Patient does not do any IV drugs, doubt any epidural abscess. Rx for Toradol, lidocaine, Flexeril at home. Patient was given strict instructions to return for any acute worsening including fever, chills, severe pain that he cannot control, numbness or weakness in his legs, bowel or bladder function loss, vomiting, any other concerning symptoms. Dr. Angelina Perdue has seen and evaluated the patient as well, he agrees with the assessment and plan. Diagnosis:   1.  Acute low back pain without sciatica, unspecified back pain laterality      Disposition: Discharge    Follow-up Information     Follow up With Specialties Details Why [address] [employer]  In 3 days  [address], [address] [facility] [address]  [phone]    [facility] EMERGENCY DEPT Emergency Medicine  If symptoms worsen [address]  [phone]          Discharge Medication List as of 4/29/2022  5:36 PM      START taking these medications    Details   lidocaine (LIDODERM) 5 % Apply patch to the affected area for 12 hours a day, remove  for 12 hours a day., Normal, Disp-1 Each, R-0      !! cyclobenzaprine (FLEXERIL) 5 mg tablet Take 2 Tablets by mouth three (3) times daily. , Normal, Disp-9 Tablet, R-0      ketorolac (TORADOL) 10 mg tablet Take 1 Tablet by mouth every six (6) hours as needed for Pain for up to 10 doses. , Normal, Disp-10 Tablet, R-0       !! - Potential duplicate medications found. Please discuss with provider. CONTINUE these medications which have NOT CHANGED    Details   ondansetron (ZOFRAN ODT) 4 mg disintegrating tablet Take 1 Tab by mouth every eight (8) hours as needed for Nausea or Vomiting for up to 12 doses. , Print, Disp-12 Tab, R-0      !! cyclobenzaprine (FLEXERIL) 10 mg tablet Take 1 Tab by mouth three (3) times daily as needed for Muscle Spasm(s). , Print, Disp-15 Tab, R-0       !! - Potential duplicate medications found. Please discuss with provider.         LG Phillips

## 2022-04-29 NOTE — ED NOTES
Seen by me as well. Gradual onset low back pain that has become severe. Worsened gradually over the course of the day. No numbness no tingling no weakness no bowel or bladder changes. No fevers or chills. No history of IV drug use. Will obtain CT abdomen pelvis for further evaluation patient does note increased urination but he does not have flank pain he has bilateral low back pain spinous and paraspinous given he has no neurologic findings only pain I think a CT should suffice initially.   Also no history of IV drug use and no evidence of cauda equina, numbness tingling or weakness

## 2022-04-29 NOTE — ED TRIAGE NOTES
Pt complaining of lower back pain, and dysuria. Pt speaking quickly in triage, RN having trouble deciphering needs of patient. Denies pain with urination.

## 2022-04-29 NOTE — ED NOTES
Pt discharged. Paperwork covered by MD. IV removed. No acute distress, pain greatly improved. Ambulates independently without difficulty. Left with family. Pt not driving.

## 2022-04-30 LAB
ATRIAL RATE: 105 BPM
CALCULATED P AXIS, ECG09: 64 DEGREES
CALCULATED R AXIS, ECG10: 79 DEGREES
CALCULATED T AXIS, ECG11: 58 DEGREES
DIAGNOSIS, 93000: NORMAL
P-R INTERVAL, ECG05: 154 MS
Q-T INTERVAL, ECG07: 316 MS
QRS DURATION, ECG06: 86 MS
QTC CALCULATION (BEZET), ECG08: 417 MS
VENTRICULAR RATE, ECG03: 105 BPM

## 2022-05-05 LAB
BACTERIA SPEC CULT: NORMAL
SERVICE CMNT-IMP: NORMAL